# Patient Record
Sex: FEMALE | Race: BLACK OR AFRICAN AMERICAN | NOT HISPANIC OR LATINO
[De-identification: names, ages, dates, MRNs, and addresses within clinical notes are randomized per-mention and may not be internally consistent; named-entity substitution may affect disease eponyms.]

---

## 2021-02-09 ENCOUNTER — APPOINTMENT (OUTPATIENT)
Dept: CT IMAGING | Facility: CLINIC | Age: 56
End: 2021-02-09
Payer: COMMERCIAL

## 2021-02-09 ENCOUNTER — OUTPATIENT (OUTPATIENT)
Dept: OUTPATIENT SERVICES | Facility: HOSPITAL | Age: 56
LOS: 1 days | End: 2021-02-09
Payer: COMMERCIAL

## 2021-02-09 DIAGNOSIS — R10.12 LEFT UPPER QUADRANT PAIN: ICD-10-CM

## 2021-02-09 PROCEDURE — 74177 CT ABD & PELVIS W/CONTRAST: CPT

## 2021-02-09 PROCEDURE — 74177 CT ABD & PELVIS W/CONTRAST: CPT | Mod: 26

## 2021-02-11 ENCOUNTER — TRANSCRIPTION ENCOUNTER (OUTPATIENT)
Age: 56
End: 2021-02-11

## 2021-03-01 ENCOUNTER — RESULT REVIEW (OUTPATIENT)
Age: 56
End: 2021-03-01

## 2021-05-14 PROBLEM — Z00.00 ENCOUNTER FOR PREVENTIVE HEALTH EXAMINATION: Status: ACTIVE | Noted: 2021-05-14

## 2024-01-30 ENCOUNTER — NON-APPOINTMENT (OUTPATIENT)
Age: 59
End: 2024-01-30

## 2024-02-08 ENCOUNTER — APPOINTMENT (OUTPATIENT)
Dept: INFECTIOUS DISEASE | Facility: CLINIC | Age: 59
End: 2024-02-08
Payer: COMMERCIAL

## 2024-02-08 VITALS
SYSTOLIC BLOOD PRESSURE: 120 MMHG | HEIGHT: 64 IN | BODY MASS INDEX: 23.9 KG/M2 | WEIGHT: 140 LBS | TEMPERATURE: 97.8 F | OXYGEN SATURATION: 97 % | DIASTOLIC BLOOD PRESSURE: 78 MMHG | HEART RATE: 77 BPM

## 2024-02-08 DIAGNOSIS — A69.20 LYME DISEASE, UNSPECIFIED: ICD-10-CM

## 2024-02-08 PROCEDURE — 99243 OFF/OP CNSLTJ NEW/EST LOW 30: CPT

## 2024-02-10 PROBLEM — A69.20 LYME DISEASE: Status: ACTIVE | Noted: 2024-02-10

## 2024-02-10 NOTE — ASSESSMENT
[FreeTextEntry1] : Ms Solano appears to be doing well. Upon review of available records, none of the lab tests were positive for Lyme. The initial antibody was negative and there were never any positive IgM bands on Western Blot. The criteria for a positive Lyme IgG Western blot is 5 bands. She had only 3 bands on 3/11/23 and only 2 on 9/29/23 and 1/3/24. The diffculties of Lyme testing was discussed. The cross reactivity of the bacterial that causes Lyme disease< Borrelia burgdorferi with oral commensal spirochetes was noted.  No concern for Lyme disease at present [Medical Care Issues] : medical care issues

## 2024-02-10 NOTE — PHYSICAL EXAM
[General Appearance - Well Nourished] : well nourished [General Appearance - In No Acute Distress] : in no acute distress [General Appearance - Alert] : alert [Outer Ear] : the ears and nose were normal in appearance [Sclera] : the sclera and conjunctiva were normal [Neck Cervical Mass (___cm)] : no neck mass was observed [Neck Appearance] : the appearance of the neck was normal [Hearing Threshold Finger Rub Not Phillips] : hearing was normal [] : no respiratory distress [Respiration, Rhythm And Depth] : normal respiratory rhythm and effort [Edema] : there was no peripheral edema [Oriented To Time, Place, And Person] : oriented to person, place, and time [Affect] : the affect was normal

## 2024-02-10 NOTE — CONSULT LETTER
[Dear  ___] : Dear  [unfilled], [Please see my note below.] : Please see my note below. [Consult Letter:] : I had the pleasure of evaluating your patient, [unfilled]. [Consult Closing:] : Thank you very much for allowing me to participate in the care of this patient.  If you have any questions, please do not hesitate to contact me. [FreeTextEntry2] : Dr Helio Poe [address] [place] [id number] [Sincerely,] : Sincerely, [FreeTextEntry3] : Moses Burton MD FACP [facility]

## 2024-02-10 NOTE — HISTORY OF PRESENT ILLNESS
[FreeTextEntry1] : This pleasant 59 year old woman comes for evaluation of previous Lyme testing. She states she was treated with a 3 month course of Doxycyline over last summer. She had nausea while taking po antibiotics. Ms Solnao is in good general health. She is subject to bronchitis, wheezes at time. She had "DJD" with back discomfort. She had COVD in  and   - has had COVID vaccination x 4. She has taken a determined approach to her health and deliberately lost 23# over the past 6 months with improvement in her lipid profile. She is retired . Currently has her son live with her. She lost her  7 years ago. One of 12 siblings Her mother and father both  at age 72  M: DM, increased cholesterol, CAD; Father PAD, renal failure She takes vit C, D, MVI, alpha lipoteichoic  acid  She has had extensive lab testing performed. 3/11/23: Lyme ab NEG; Lyme WESTERN BLOT IgM NO BAND; IgG 3 bands: p58, p 41, C = 5.9; LDL Chol 112, hsCRP 3.9; Neg Quant gold tb; NEg Trep pal, +HHV6 HIV Neg 23: Lyme ab NEG; Lyme WESTERN BLOT IgM NO BAND; IgG 2 bands: p58, p 41 1/3/24: Lyme ab NEG; Lyme WESTERN BLOT IgM NO BAND; IgG 2 bands: p58, p 41,  wbc= 5.7; Creat = 0.9; LDL chol =90; A1C = 6.1

## 2024-07-10 ENCOUNTER — NON-APPOINTMENT (OUTPATIENT)
Age: 59
End: 2024-07-10